# Patient Record
Sex: FEMALE | Race: WHITE | NOT HISPANIC OR LATINO | Employment: OTHER | URBAN - METROPOLITAN AREA
[De-identification: names, ages, dates, MRNs, and addresses within clinical notes are randomized per-mention and may not be internally consistent; named-entity substitution may affect disease eponyms.]

---

## 2023-05-02 ENCOUNTER — APPOINTMENT (OUTPATIENT)
Dept: GENERAL RADIOLOGY | Facility: HOSPITAL | Age: 73
End: 2023-05-02
Payer: MEDICARE

## 2023-05-02 PROCEDURE — 73110 X-RAY EXAM OF WRIST: CPT

## 2023-05-02 PROCEDURE — 99283 EMERGENCY DEPT VISIT LOW MDM: CPT

## 2023-05-03 ENCOUNTER — HOSPITAL ENCOUNTER (EMERGENCY)
Facility: HOSPITAL | Age: 73
Discharge: HOME | End: 2023-05-03
Attending: EMERGENCY MEDICINE
Payer: MEDICARE

## 2023-05-03 VITALS
SYSTOLIC BLOOD PRESSURE: 156 MMHG | RESPIRATION RATE: 18 BRPM | OXYGEN SATURATION: 96 % | WEIGHT: 150 LBS | HEIGHT: 62 IN | HEART RATE: 78 BPM | BODY MASS INDEX: 27.6 KG/M2 | TEMPERATURE: 97.7 F | DIASTOLIC BLOOD PRESSURE: 90 MMHG

## 2023-05-03 DIAGNOSIS — S52.502A CLOSED FRACTURE OF DISTAL END OF LEFT RADIUS, UNSPECIFIED FRACTURE MORPHOLOGY, INITIAL ENCOUNTER: Primary | ICD-10-CM

## 2023-05-03 RX ORDER — TIMOLOL MALEATE 5 MG/ML
SOLUTION/ DROPS OPHTHALMIC
COMMUNITY
Start: 2023-02-08

## 2023-05-03 RX ORDER — HYDROCODONE BITARTRATE AND ACETAMINOPHEN 10; 325 MG/1; MG/1
0.5 TABLET ORAL EVERY 6 HOURS PRN
Qty: 6 TABLET | Refills: 0 | Status: SHIPPED | OUTPATIENT
Start: 2023-05-03

## 2023-05-03 RX ORDER — ACETAMINOPHEN 500 MG
1000 TABLET ORAL ONCE
Status: COMPLETED | OUTPATIENT
Start: 2023-05-03 | End: 2023-05-03

## 2023-05-03 RX ORDER — LATANOPROST 50 UG/ML
SOLUTION/ DROPS OPHTHALMIC
COMMUNITY
Start: 2023-02-22

## 2023-05-03 RX ADMIN — ACETAMINOPHEN 1000 MG: 500 TABLET ORAL at 00:51

## 2023-05-03 NOTE — ED PROVIDER NOTES
MD ATTESTATION NOTE    The WINIFRED and I have discussed this patient's history, physical exam, and treatment plan.  I have reviewed the documentation and personally had a face to face interaction with the patient. I affirm the documentation and agree with the treatment and plan.  The attached note describes my personal findings.      I provided a substantive portion of the care of the patient.  I personally performed the physical exam in its entirety, and below are my findings.  For this patient encounter, the patient wore surgical mask, I wore full protective PPE including N95 and eye protection.      Brief HPI: Patient tripped and fell just prior to arrival.  She tried to catch herself with her left hand.  She complains of left wrist pain.  She did not hit her head.  Denies loss of consciousness.  She is right-handed.  Denies numbness/tingling in her left upper extremity.  She is not on anticoagulants    PHYSICAL EXAM  ED Triage Vitals   Temp Heart Rate Resp BP SpO2   05/02/23 2057 05/02/23 2057 05/02/23 2057 05/02/23 2059 05/02/23 2057   97.7 °F (36.5 °C) 88 18 171/79 97 %      Temp src Heart Rate Source Patient Position BP Location FiO2 (%)   -- -- 05/02/23 2059 05/02/23 2059 --     Standing Right arm          GENERAL: Awake, alert, oriented x3.  Well-developed, well-nourished elderly female.  Resting comfortably no acute distress  HENT: nares patent, NCAT  EYES: no scleral icterus  CV: regular rhythm, normal rate, normal left radial pulse, brisk cap refill in the left finger  RESPIRATORY: normal effort  ABDOMEN: soft  MUSCULOSKELETAL: There is tenderness and swelling over the lateral aspect of the left wrist.  Remainder of the left upper extremity is nontender.  C/T/L-spine are nontender  NEURO: Normal strength and light touch sensation in the left upper extremity.  GCS 15  PSYCH:  calm, cooperative  SKIN: warm, dry    Vital signs and nursing notes reviewed.        Plan: Obtain x-rays    X-ray shows a impacted  comminuted fracture of the distal radius.  Patient will be placed in a splint.  She is in town visiting from [place] for the next 2 weeks.  She will be referred to orthopedics for follow-up    ED Course as of 05/03/23 0357   Wed May 03, 2023   0043 Left wrist x-ray personally interpreted by me.  My personal interpretation is: There is a comminuted fracture of the distal radius.  No dislocation. []   0051 LIAM query complete. Treatment plan to include limited course of prescribed  controlled substance. Risks including addiction, benefits, and alternatives presented to patient.    [WH]      ED Course User Index  [WH] Grayson Domínguez MD Holland, William D, MD  05/03/23 0357

## 2023-05-03 NOTE — ED PROVIDER NOTES
EMERGENCY DEPARTMENT ENCOUNTER    Room Number:  04/04  Date seen:  5/3/2023  PCP: System, Provider Not In      HPI:  Chief Complaint: Left wrist injury  A complete HPI/ROS/PMH/PSH/SH/FH are unobtainable due to: Not  Context: Ayana Elder is a 72 y.o. female who presents to the ED c/o left wrist injury.  Patient states she was ambulating earlier this evening on the sidewalk.  She came across an area of uneven sidewalk and when the squares was elevated approximately a few inches.  Patient states she tripped on this uneven area of the sidewalk falling forward on outstretched hands.  The left hand and wrist were said to take the brunt of the fall.  Patient denies hitting her head in the process and denies being on anticoagulants.  She is describing moderate pain localized to the left wrist.  She denies sustaining any other injury at this time.  She is here for further evaluation.          PAST MEDICAL HISTORY  Active Ambulatory Problems     Diagnosis Date Noted   • No Active Ambulatory Problems     Resolved Ambulatory Problems     Diagnosis Date Noted   • No Resolved Ambulatory Problems     Past Medical History:   Diagnosis Date   • Arthritis          PAST SURGICAL HISTORY  History reviewed. No pertinent surgical history.      FAMILY HISTORY  History reviewed. No pertinent family history.      SOCIAL HISTORY  Social History     Socioeconomic History   • Marital status:          ALLERGIES  Patient has no known allergies.        REVIEW OF SYSTEMS  Review of Systems     All systems reviewed and negative except for those discussed in HPI.       PHYSICAL EXAM  ED Triage Vitals   Temp Heart Rate Resp BP SpO2   05/02/23 2057 05/02/23 2057 05/02/23 2057 05/02/23 2059 05/02/23 2057   97.7 °F (36.5 °C) 88 18 171/79 97 %      Temp src Heart Rate Source Patient Position BP Location FiO2 (%)   -- -- 05/02/23 2059 05/02/23 2059 --     Standing Right arm        Physical Exam      GENERAL: Very pleasant, nontoxic, not  distressed  HENT: nares patent  EYES: no scleral icterus  CV: regular rhythm, normal rate  RESPIRATORY: normal effort  ABDOMEN: soft  MUSCULOSKELETAL: Left wrist: Mild soft tissue swelling no gross deformity.  The hand and the left arm proximal to the left wrist is unremarkable and there is no bony tenderness or deformity.    NEURO: alert, moves all extremities, follows commands  PSYCH:  calm, cooperative  SKIN: warm, dry    Vital signs and nursing notes reviewed.          LAB RESULTS  No results found for this or any previous visit (from the past 24 hour(s)).    Ordered the above labs and reviewed the results.        RADIOLOGY  XR Wrist 3+ View Left    Result Date: 5/2/2023  3 VIEWS LEFT WRIST  HISTORY: Fall on outstretched hand  COMPARISON: None available.  FINDINGS: The patient is a comminuted impacted fracture of the distal radius, with extension to the articular surface. There is an old ulnar styloid fracture. I cannot exclude a superimposed acute additional ulnar styloid fracture. No additional fractures are seen. The patient does have advanced degenerative changes at the carpometacarpal joint of thumb. Soft tissue swelling is noted about the wrist.      Comminuted impacted fracture of the distal radius. Superimposed acute on chronic ulnar styloid fracture is not excluded.  This report was finalized on 5/2/2023 11:59 PM by Dr. Sophie Cedillo M.D.        Ordered the above noted radiological studies. Reviewed by me in PACS.          PROCEDURES  Splint - Cast - Strapping    Date/Time: 5/3/2023 1:10 AM  Performed by: Marky Rehman III, PA  Authorized by: Grayson Domínguez MD     Consent:     Consent obtained:  Verbal    Consent given by:  Patient    Risks discussed:  Discoloration and numbness  Universal protocol:     Patient identity confirmed:  Verbally with patient  Pre-procedure details:     Distal neurologic exam:  Normal    Distal perfusion: distal pulses strong and brisk capillary refill     Procedure details:     Location:  Wrist    Wrist location:  L wrist    Splint type:  Sugar tong    Supplies:  Elastic bandage, fiberglass and cotton padding  Post-procedure details:     Distal neurologic exam:  Normal    Procedure completion:  Tolerated well, no immediate complications              MEDICATIONS GIVEN IN ER  Medications   acetaminophen (TYLENOL) tablet 1,000 mg (1,000 mg Oral Given 5/3/23 0051)         MEDICAL DECISION MAKING, PROGRESS, and CONSULTS    Discussion below represents my analysis of pertinent findings related to patient's condition, differential diagnosis, treatment plan and final disposition.      Orders placed during this visit:  Orders Placed This Encounter   Procedures   • Splint Cast Strapping   • [facility] 02.  Shoulder Immobilizer/Sling   • XR Wrist 3+ View Left         Additional sources:  - Discussed/obtained information from independent historians: Friend confirms MRI history  Additional information was obtained to confirm the patient's history.    - External (non-ED) record review: Reviewing the patient's chart in [facility].  She is followed by a Tracy Travis for hyperlipidemia, glaucoma, vitamin D deficiency.  She was seen on 3/23/2023.  Diet and exercise are recommended.  Routine labs are drawn.  Medications were refilled and the patient was to follow-up routinely..  LIAM reviewed by Grayson Domínguez MD         - Chronic or social conditions impacting care: None       Differential diagnosis:    Colles' fracture, Camelia fracture, Obregon's fracture, Alvarenga fracture, Galeazzi fracture.  Clinical picture most consistent with a minimally to nondisplaced Colles' fracture.  Will obtain x-ray to further evaluate            Independent interpretation of labs, radiology studies, and discussions with consultants:  ED Course as of 05/03/23 0113   Wed May 03, 2023   0043 Left wrist x-ray personally interpreted by me.  My personal interpretation is: There is a comminuted  fracture of the distal radius.  No dislocation. []   0051 LIAM query complete. Treatment plan to include limited course of prescribed  controlled substance. Risks including addiction, benefits, and alternatives presented to patient.    [WH]      ED Course User Index  [WH] Grayson Domínguez MD               DIAGNOSIS  Final diagnoses:   Closed fracture of distal end of left radius, unspecified fracture morphology, initial encounter         DISPOSITION  DISCHARGE    Patient discharged in stable condition.    Reviewed implications of results, diagnosis, meds, responsibility to follow up, warning signs and symptoms of possible worsening, potential complications and reasons to return to ER.    Patient/Family voiced understanding of above instructions.    Discussed plan for discharge, as there is no emergent indication for admission. Patient referred to primary care provider for BP management due to today's BP. Pt/family is agreeable and understands need for follow up and repeat testing.  Pt is aware that discharge does not mean that nothing is wrong but it indicates no emergency is present that requires admission and they must continue care with follow-up as given below or physician of their choice.     FOLLOW-UP  Rafal Abdul MD  [address] [id number] [phone]    Schedule an appointment as soon as possible for a visit   For further evaluation and treatment         Medication List      New Prescriptions    HYDROcodone-acetaminophen  MG per tablet  Commonly known as: NORCO  Take 0.5 tablets by mouth Every 6 (Six) Hours As Needed for Moderate Pain.           Where to Get Your Medications      These medications were sent to [id number] - [place], [place] - [address] AT [facility] [address] [facility], [facility] - [phone] [facility] [phone]   [address] [facility] [facility] [address]    Phone: [phone]   · HYDROcodone-acetaminophen  MG per  tablet           Latest Documented Vital Signs:  As of 01:13 EDT  BP- 153/78 HR- 81 Temp- 97.7 °F (36.5 °C) O2 sat- 96%      --    Please note that portions of this were completed with a voice recognition program.       Note Disclaimer: At [facility], we believe that sharing information builds trust and better relationships. You are receiving this note because you are receiving care at [facility] or recently visited. It is possible you will see health information before a provider has talked with you about it. This kind of information can be easy to misunderstand. To help you fully understand what it means for your health, we urge you to discuss this note with your provider.       Marky Rehman III, PA  05/03/23 0113

## 2023-05-03 NOTE — ED TRIAGE NOTES
Pt to Er after tripping and falling. Pt states she caught herself on her knees and wrists. Pt mostly c/o L wrist pain traveling up her forearm. Pt denies LOC, striking head or thinners.

## 2023-05-05 ENCOUNTER — OFFICE VISIT (OUTPATIENT)
Dept: ORTHOPEDIC SURGERY | Facility: CLINIC | Age: 73
End: 2023-05-05
Payer: MEDICARE

## 2023-05-05 VITALS — BODY MASS INDEX: 28.14 KG/M2 | WEIGHT: 152.9 LBS | TEMPERATURE: 97.8 F | HEIGHT: 62 IN

## 2023-05-05 DIAGNOSIS — S52.502A CLOSED FRACTURE OF DISTAL END OF LEFT RADIUS, UNSPECIFIED FRACTURE MORPHOLOGY, INITIAL ENCOUNTER: Primary | ICD-10-CM

## 2023-05-05 RX ORDER — MULTIPLE VITAMINS W/ MINERALS TAB 9MG-400MCG
TAB ORAL EVERY 24 HOURS
COMMUNITY

## 2023-05-05 RX ORDER — FEXOFENADINE HCL 180 MG/1
TABLET ORAL EVERY 24 HOURS
COMMUNITY

## 2023-05-05 NOTE — PROGRESS NOTES
History & Physical       Patient: Ayana Elder    YOB: 1950    Medical Record Number: [id number]    Chief Complaints: Left wrist injury    History of Present Illness: 72 y.o. female presents for evaluation of the left wrist.  The injury was sustained in a fall roughly 3 days ago.  She has been wearing a splint.  Current pain is described as moderate, constant, and aching.  Pain is worse with movement.  Rest, the splint, and pain medicine have all helped.  She is right hand dominant.  Of note, she is very claustrophobic.  She hates having the splint on her wrist because it aggravates her claustrophobia.    Allergies   Allergen Reactions   • Latex, Natural Rubber Rash       Home Medications:      Current Outpatient Medications:   •  HYDROcodone-acetaminophen (NORCO)  MG per tablet, Take 0.5 tablets by mouth Every 6 (Six) Hours As Needed for Moderate Pain., Disp: 6 tablet, Rfl: 0  •  latanoprost (XALATAN) 0.005 % ophthalmic solution, , Disp: , Rfl:   •  timolol (TIMOPTIC) 0.5 % ophthalmic solution, , Disp: , Rfl:   •  fexofenadine (Allegra Allergy) 180 MG tablet, Daily., Disp: , Rfl:   •  Magnesium 400 MG capsule, Daily., Disp: , Rfl:   •  multivitamin with minerals (CENTRUM SILVER 50+WOMEN PO), Daily., Disp: , Rfl:     Past Medical History:   Diagnosis Date   • Arthritis         No past surgical history on file.       Social History     Occupational History   • Not on file   Tobacco Use   • Smoking status: Never     Passive exposure: Never   • Smokeless tobacco: Never   Vaping Use   • Vaping Use: Never used   Substance and Sexual Activity   • Alcohol use: Never   • Drug use: Never   • Sexual activity: Not Currently      Social History     Social History Narrative   • Not on file        History reviewed. No pertinent family history.    Review of Systems:      Constitutional: Denies fever, shaking or chills   Eyes: Denies change in visual acuity   HEENT: Denies nasal congestion or sore throat  "  Respiratory: Denies cough or shortness of breath   Cardiovascular: Denies chest pain or edema  Endocrine: Denies tremors, palpitations, intolerance of heat or cold, polyuria, polydipsia.  GI: Denies abdominal pain, nausea, vomiting, bloody stools or diarrhea  : Denies frequency, urgency, incontinence, retention, or nocturia.  Musculoskeletal: Denies numbness tingling or loss of motor function except as above  Integument: Denies rash, lesion or ulceration   Neurologic: Denies headache or focal weakness, deficits  Heme: Denies epistaxis, spontaneous or excessive bleeding, epistaxis, hematuria, melena, fatigue, enlarged or tender lymph nodes.      All other pertinent positives and negatives as noted above in HPI.    Physical Exam: 72 y.o. female    Vitals:    05/05/23 1147   Temp: 97.8 °F (36.6 °C)   Weight: 69.4 kg (152 lb 14.4 oz)   Height: 157.5 cm (62\")       General:  Patient is awake and alert.  Appears in no acute distress or discomfort.    Psych:  Affect and demeanor are appropriate.    Eyes:  Conjunctiva and sclera appear grossly normal.  Eyes track well and EOM seem to be intact.    Dentition:  No gross abnormalities noted.    Ears:  No gross abnormalities.  Hearing adequate for the exam.    Cardiovascular:  Regular rate and rhythm.    Lungs:  Good chest expansion.  Breathing unlabored.    Lymph:  No palpable masses or adenopathy in the affected extremity    Left upper extremity:  Splint was in place and removed.  There is dorsal edema and ecchymosis.  Skin appears benign.  No lacerations or abrasions.  Focal tenderness noted over the distal radius.  No tenderness in the hand or into the upper arm or elbow.  No palpable masses or adenopathy.  Compartments soft.  Painful, limited ROM of the wrist.  Could not assess stability due to discomfort with motion.  Good strength in the hand with finger flexion and extension as well as thumb abduction.  Intact sensation.  Brisk cap refill.  Palpable radial pulse. "     Diagnostic Tests:  No results found for: GLUCOSE, CALCIUM, NA, K, CO2, CL, BUN, CREATININE, EGFRIFAFRI, EGFRIFNONA, BCR, ANIONGAP  No results found for: WBC, HGB, HCT, MCV, PLT  No results found for: INR, PROTIME    Imaging:  Outside AP, oblique and lateral views of the left wrist are reviewed along with the associated report.  The x-rays show an intra-articular distal radius fracture.  This is seen on all 3 views but the lateral view best shows the splint going up into the posterior aspect of the joint.  Length, tilt and inclination all look okay.    Assessment:  Left distal radius fracture    Plan: This is her nondominant arm and alignment looks reasonable in terms of her length, tilt and inclination.  There is a roughly 2 mm gap in the posterior joint from the intra-articular split but I expect this would heal well with conservative treatment.  Risks were discussed including nonunion, malunion, post traumatic arthritis, further displacement necessitating operative intervention, arthrofibrosis, and persistent pain or motion loss necessitating further intervention.  I suggested a cast.  She says that she does not think she could tolerate it.  She would prefer a brace.  I think an Exos brace would be the most supportive brace I could offer her.  She was fitted for that today in the clinic.  We will reconvene in 7-10 days for repeat x-rays.  We discussed appropriate activity modifications and restrictions including no lifting greater than 2 pounds, pushing, or pulling with the affected arm.  Of note, she lives in [place] and will be heading back there in a couple of weeks.  I told her that should be fine but I need to check another set of x-rays next week and perhaps another set the week after that before she returns.  She is in agreement.    Date: 5/5/2023    Rafal Abdul MD

## 2023-05-12 ENCOUNTER — OFFICE VISIT (OUTPATIENT)
Dept: ORTHOPEDIC SURGERY | Facility: CLINIC | Age: 73
End: 2023-05-12
Payer: MEDICARE

## 2023-05-12 VITALS — BODY MASS INDEX: 27.97 KG/M2 | WEIGHT: 152 LBS | HEIGHT: 62 IN | TEMPERATURE: 97.3 F

## 2023-05-12 DIAGNOSIS — R52 PAIN: Primary | ICD-10-CM

## 2023-05-12 DIAGNOSIS — Z09 FRACTURE FOLLOW-UP: ICD-10-CM

## 2023-05-12 NOTE — PROGRESS NOTES
Ayana Elder : 1950 MRN: [id number] DATE: 2023    CC: Closed treatment left distal radius fracture    HPI: Ms. Elder returns to clinic today stating pain is improving.  No complaints.  Pain is well-controlled.      Vitals:    23 1520   Temp: 97.3 °F (36.3 °C)       Exam:  Brace in place.  Skin intact and benign. Moves fingers well and without discomfort.  Good sensory function in the fingers and thumb.  Brisk cap refill      Imaging  3v xrays including AP, oblique and lateral views of the wrist are ordered and reviewed by me to evaluate alignment and for comparison purposes.  No new or concerning findings noted.  Alignment is unchanged.    Impression:  2 weeks s/p closed treatment left distal radius fracture    Plan:  The alignment appears unchanged.  Continue brace immobilization.  She is leaving for [place] next week.  She will follow up with an orthopedist there.    Rafal Abdul MD

## 2023-05-15 ENCOUNTER — TELEPHONE (OUTPATIENT)
Dept: ORTHOPEDIC SURGERY | Facility: CLINIC | Age: 73
End: 2023-05-15
Payer: MEDICARE